# Patient Record
Sex: MALE | Race: WHITE
[De-identification: names, ages, dates, MRNs, and addresses within clinical notes are randomized per-mention and may not be internally consistent; named-entity substitution may affect disease eponyms.]

---

## 2018-01-02 ENCOUNTER — HOSPITAL ENCOUNTER (OUTPATIENT)
Dept: HOSPITAL 77 - KA.SDS | Age: 83
Discharge: NURSING, REHAB OR LONG-TERM CARE | End: 2018-01-02
Attending: OPHTHALMOLOGY
Payer: MEDICARE

## 2018-01-02 VITALS — DIASTOLIC BLOOD PRESSURE: 40 MMHG | SYSTOLIC BLOOD PRESSURE: 151 MMHG

## 2018-01-02 DIAGNOSIS — E03.9: ICD-10-CM

## 2018-01-02 DIAGNOSIS — D63.8: ICD-10-CM

## 2018-01-02 DIAGNOSIS — Z79.899: ICD-10-CM

## 2018-01-02 DIAGNOSIS — K21.9: ICD-10-CM

## 2018-01-02 DIAGNOSIS — H25.812: Primary | ICD-10-CM

## 2018-01-02 PROCEDURE — 66984 XCAPSL CTRC RMVL W/O ECP: CPT

## 2018-01-02 PROCEDURE — C1780 LENS, INTRAOCULAR (NEW TECH): HCPCS

## 2018-01-02 RX ADMIN — CYCLOPENTOLATE HYDROCHLORIDE SCH DROP: 10 SOLUTION/ DROPS OPHTHALMIC at 08:32

## 2018-01-02 RX ADMIN — PHENYLEPHRINE HYDROCHLORIDE SCH DROP: 100 SOLUTION/ DROPS OPHTHALMIC at 09:16

## 2018-01-02 RX ADMIN — CYCLOPENTOLATE HYDROCHLORIDE SCH DROP: 10 SOLUTION/ DROPS OPHTHALMIC at 08:47

## 2018-01-02 RX ADMIN — PHENYLEPHRINE HYDROCHLORIDE SCH DROP: 100 SOLUTION/ DROPS OPHTHALMIC at 08:42

## 2018-01-02 RX ADMIN — CYCLOPENTOLATE HYDROCHLORIDE SCH DROP: 10 SOLUTION/ DROPS OPHTHALMIC at 09:06

## 2018-01-02 RX ADMIN — PHENYLEPHRINE HYDROCHLORIDE SCH DROP: 100 SOLUTION/ DROPS OPHTHALMIC at 09:01

## 2018-01-03 NOTE — OR
DATE OF SURGERY:  01/02/2018

 

SURGEON:  Larry Nelson MD

 

PREOPERATIVE DIAGNOSIS:  Cataract, left eye.

 

POSTOPERATIVE DIAGNOSIS:  Cataract, left eye.

 

OPERATION PERFORMED:  Phacoemulsification with posterior chamber lens insertion,

left eye.

 

HISTORY:  The patient has had increasing difficulty seeing to read and

difficulty seeing television.  His vision is 20/100 in his left eye.  The eye

examination reveals that the patient has a combined-type cataract and he also

has a senile-type cataract.

 

FINDINGS:  The patient was taken to the operating room where appropriate

anesthesia, sedation and monitoring were provided.  A retrobulbar block was

given on the left side.  The eye was massaged and was found to be appropriately

soft.  The eye and eyelids were then prepped and draped in the usual sterile

manner.  A lid speculum was placed.  A micro sharp blade was used to enter the

anterior chamber inside the limbus inferior-temporally.  Xylocaine was irrigated

into the eye at this site. Healon was irrigated into the eye through this site.

Then using a 2.85 mm corneal blade an entry was made into the anterior chamber

just inside the limbus temporally.  Healon was again irrigated into the eye.

Then using a cystitome, the anterior capsulorrhexis was created.  The lens

nucleus was hydrodissected using a 27 gauge cannula and balanced salt solution.

The phacoemulsification unit was introduced through the temporal site and the

Darren spatula through the inferior temporal site.  In so doing, the lens

nucleus was phacoemulsified.  The cortical fragments of the lens were removed

using the irrigation aspiration unit.  The posterior capsule was polished.

Healon was irrigated into the eye.  The posterior chamber lens was inserted and

rotated into position inside the capsular bag.  The Healon was irrigated out of

the eye.  Miostat was irrigated into the eye and the pupil rounded nicely.  A

single interrupted 10-0 Nylon suture was placed through the temporal corneal

incision site.  Balanced salt solution was irrigated into the eye.  The wound

was tested and found to be tight.  Maxitrol ointment was placed into the

patient's left eye.  The eyelids were closed and an eye patch and dean shield

were placed.  The patient left the operating room in good condition.

 

DD:  01/02/2018 10:25:40

DT:  01/02/2018 14:40:45

Job #:  [id number]

## 2018-02-07 ENCOUNTER — HOSPITAL ENCOUNTER (OUTPATIENT)
Dept: HOSPITAL 77 - KA.ED | Age: 83
Setting detail: OBSERVATION
LOS: 1 days | Discharge: NURSING, REHAB OR LONG-TERM CARE | End: 2018-02-08
Attending: PHYSICIAN ASSISTANT | Admitting: PHYSICIAN ASSISTANT
Payer: MEDICARE

## 2018-02-07 DIAGNOSIS — M81.0: ICD-10-CM
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
## 2018-02-07 DIAGNOSIS — Z79.899: ICD-10-CM

## 2018-02-07 DIAGNOSIS — E78.00: ICD-10-CM

## 2018-02-07 DIAGNOSIS — E55.9: ICD-10-CM

## 2018-02-07 DIAGNOSIS — K21.9: ICD-10-CM

## 2018-02-07 DIAGNOSIS — E03.9: ICD-10-CM

## 2018-02-07 DIAGNOSIS — Z87.891: ICD-10-CM

## 2018-02-07 DIAGNOSIS — R62.7: ICD-10-CM

## 2018-02-07 DIAGNOSIS — E46: ICD-10-CM

## 2018-02-07 DIAGNOSIS — Z79.82: ICD-10-CM

## 2018-02-07 DIAGNOSIS — G20: ICD-10-CM

## 2018-02-07 DIAGNOSIS — S09.90XA: ICD-10-CM

## 2018-02-07 DIAGNOSIS — I25.10: ICD-10-CM

## 2018-02-07 DIAGNOSIS — G45.9: Primary | ICD-10-CM

## 2018-02-07 DIAGNOSIS — F32.9: ICD-10-CM

## 2018-02-07 DIAGNOSIS — F02.80: ICD-10-CM

## 2018-02-07 LAB
CHLORIDE SERPL-SCNC: 107 MMOL/L (ref 98–115)
SODIUM SERPL-SCNC: 140 MMOL/L (ref 136–145)

## 2018-02-07 PROCEDURE — G0378 HOSPITAL OBSERVATION PER HR: HCPCS

## 2018-02-07 NOTE — EDM.PDOC
ED HPI GENERAL MEDICAL PROBLEM





- General


Chief Complaint: Trauma


Stated Complaint: fall


Time Seen by Provider: 02/07/18 18:10


Source of Information: Reports: RN


History Limitations: Reports: Altered Mental Status





- History of Present Illness


INITIAL COMMENTS - FREE TEXT/NARRATIVE: 





89-year-old male resident at the [facility] walking to the 

doing all this evening as he uses a walker he lost his footing as he was only 

wearing his socks and not shoes and took a fall into a trash can he had a small 

abrasion over the right upper eye is felt that he had mild scalp swelling on 

the right occipital. There was no loss of consciousness. Nursing staff thought 

that he was mildly confused and was recommended that he be evaluated in the 

emergency room. EMS was dispatched and he is brought here for further 

evaluation. He currently denies significant pain any where specific. He is very 

soft-spoken and hard to hear. He is very thin and appears to be malnourished. 

He denies any specific chest pain or shortness of breath. He denies any 

headache. He is able to answer simple questions. He is in no acute distress. 

His blood pressure is elevated upon arrival.


Onset: Today


Onset Date: 02/07/18


Onset Time: 17:30


Duration: Minutes:


Location: Reports: Head


Severity: Mild


Improves with: Reports: None


Worsens with: Reports: None


Context: Reports: Trauma (fall)


Associated Symptoms: Reports: No Other Symptoms





- Related Data


 Allergies











Allergy/AdvReac Type Severity Reaction Status Date / Time


 


No Known Drug Allergies Allergy  NKDA Verified 02/07/18 18:46











Home Meds: 


 Home Meds





Carbidopa/Levodopa [Carbidopa-Levodopa ] 1 tab PO TID 06/10/14 [History]


Cholecalciferol (Vitamin D3) [Vitamin D3] 2,000 unit PO DAILY 06/10/14 [History]


Cyanocobalamin (Vitamin B-12) [Cyanocobalamin Injection] 1,000 mcg IM 

ASDIRECTED 06/10/14 [History]


FLUoxetine HCl [Fluoxetine HCl] 20 mg PO DAILY 06/10/14 [History]


Sennosides/Docusate Sodium [Senna S Tablet] 1 each PO DAILY 06/10/14 [History]


Multivitamin with Minerals [Multiple Vitamin] 1 tab PO DAILY 01/02/16 [History]


Acetaminophen [Acetaminophen Extra Strength] 500 mg PO BID 02/27/16 [History]


Acetaminophen [Tylenol Extra Strength] 500 mg PO ASDIRECTED PRN 02/27/16 [

History]


Midodrine 2.5 mg PO TIDAC #90 tablet 02/29/16 [Rx]


Ascorbic Acid 500 mg PO BID 07/28/16 [History]


Magnesium Hydroxide [Milk of Magnesia] 1 dose PO DAILY PRN 07/28/16 [History]


Loperamide [Imodium] 2 mg PO Q4H PRN #30 cap 08/01/16 [Rx]


Ferrous Sulfate [Iron] 325 mg PO DAILY 02/07/18 [History]


Levothyroxine Sodium [Synthroid] 100 mcg PO ACBREAKFAST 02/07/18 [History]


Lutein/Minerals/Vit A,C & E [Ocuvite] 1 tab PO DAILY 02/07/18 [History]


Mirtazapine [Remeron] 15 mg PO BEDTIME 02/07/18 [History]


Omeprazole 20 mg PO DAILY 02/07/18 [History]


Sucralfate [Carafate] 1 gram PO BID 02/07/18 [History]


Trolamine Salicylate/Aloe Vera [Aspercreme 10% Cream] 1 applic TOP BEDTIME PRN 

02/07/18 [History]


Trolamine Salicylate/Aloe Vera [Aspercreme 10% Cream] 1 applic TOP TID 02/07/18 

[History]


prednisoLONE Acetate [Pred Forte 1% Ophth Susp] 1 drop EYELF QID 02/07/18 [

History]











Past Medical History


HEENT History: Reports: Cataract, Epistaxis, Hard of Hearing, Impaired Vision, 

Sinusitis


Cardiovascular History: Reports: CAD, High Cholesterol


Other Cardiovascular History: hypotension, athrosclerotic heart disease.


Gastrointestinal History: Reports: Chronic Constipation, Chronic Diarrhea, GERD


Genitourinary History: Reports: UTI, Recurrent


Musculoskeletal History: Reports: Osteoarthritis


Other Musculoskeletal History: generalized muscle weakness


Neurological History: Reports: Parkinson's


Other Neuro History: dementia


Psychiatric History: Reports: Depression


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


Endocrine/Metabolic History: Reports: Hypothyroidism, Vitamin D Deficiency


Hematologic History: Reports: Anemia





- Past Surgical History


HEENT Surgical History: Reports: Cataract Surgery


Cardiovascular Surgical History: Reports: None


Respiratory Surgical History: Reports: None


Other Musculoskeletal Surgeries/Procedures:: arthritis all over.





Social & Family History





- Family History


Family Medical History: Noncontributory





- Tobacco Use


Smoking Status *Q: Former Smoker


Years of Tobacco use: 30


Packs/Tins Daily: 2


Used Tobacco, but Quit: Yes


Month Tobacco Last Used: 50 years


Second Hand Smoke Exposure: No





- Caffeine Use


Caffeine Use: Reports: Soda





- Alcohol Use


Days Per Week of Alcohol Use: 0





- Recreational Drug Use


Recreational Drug Use: No





ED ROS GENERAL





- Review of Systems


Review Of Systems: ROS reveals no pertinent complaints other than HPI.





ED EXAM, HEAD INJURY





- Physical Exam


Exam: See Below


Exam Limited By: Altered Mental Status


General Appearance: Alert, No Apparent Distress, Thin


Head: Scalp Swelling.  No: Scalp Lacerations, Crabtree's Sign, Facial Abrasions, 

Facial Swelling, Sinus Tenderness, Facial Tenderness, Raccoon Eyes


Eyes: Right Eye: Abnormal Pupil (right pupil is larger, pupils reexamed and 

equal after CT), Bilateral Eye: EOMI


Ears: Normal External Exam, Normal Canal, Hearing Grossly Normal, Normal TMs


Nose: Normal Inspection, Normal Mucousa, No Blood


Throat/Mouth: Normal Inspection, Normal Lips, Normal Gums, Normal Oropharynx, 

Normal Voice, No Airway Compromise


Neck: Non-Tender, Full Range of Motion, Normal Alignment, Normal Inspection


Respiratory: No Respiratory Distress, Lungs Clear, Normal Breath Sounds, No 

Accessory Muscle Use


Cardiovascular: Regular Rate, Rhythm, No JVD, No Murmur


GI/Abdominal Exam: Soft, Non-Tender


Back Exam: Normal Inspection, Full Range of Motion, Paraspinal Tenderness


Extremities: Normal Inspection, Normal Range of Motion, Non-Tender, No Pedal 

Edema, Normal Capillary Refill


Neurologic: CNs II-XII nml As Tested, No Motor/Sensory Deficits, Alert, Normal 

Mood/Affect, Oriented x 3


Skin: Normal Color, Warm/Dry, Ecchymosis, Other (small skin tear right arm)





- Jose Luis Coma Score


Best Eye Response (Jose Luis): (4) Open Spontaneously


Best Verbal Response (Hudson): (5) Oriented


Best Motor Response (Hudson): (6) Obeys Commands


Jose Luis Total: 15





Course





- Vital Signs


Last Recorded V/S: 


 Last Vital Signs











Temp  95.1 F L  02/07/18 18:25


 


Pulse  54 L  02/07/18 19:18


 


Resp  16   02/07/18 19:18


 


BP  174/37 H  02/07/18 19:18


 


Pulse Ox  100   02/07/18 19:18














- Orders/Labs/Meds


Orders: 


 Active Orders 24 hr











 Category Date Time Status


 


 Head wo Cont [CT] Stat Exams  02/07/18 18:31 Ordered














- Radiology Interpretation


Free Text/Narrative:: 





CT the head without IV contrast





There is a comparison correlation is made with the exam of [place] 10, 2014





Findings;


There is no mass or mass effect


There is no hemorrhage or hydrocephalus


There are no extra axial fluid collections


There are no sites of abnormal attenuation





Impression:


 No plain CT evidence of acute intracranial process





CT Results Date: 02/07/18


CT Results Time: 19:30





- Re-Assessments/Exams


Free Text/Narrative Re-Assessment/Exam: 





02/07/18 19:14


Patient is alert and oriented, again he describes no distress no discomfort. He 

is comfortable at bedside. Pupils were reexamined after CT scan are now 

symmetric and equal.





Departure





- Departure


Time of Disposition: 19:35


Disposition: DC/Tfer to [facility]


Condition: Good


Clinical Impression: 


Fall


Qualifiers:


 Encounter type: initial encounter Qualified Code(s): W19.XXXA - Unspecified 

fall, initial encounter





Head contusion


Qualifiers:


 Encounter type: initial encounter Contusion of head detail: eyelid Laterality: 

right Qualified Code(s): S00.11XA - Contusion of right eyelid and periocular 

area, initial encounter








- Discharge Information


Instructions:  Head Injury, Adult, Easy-to-Read


Referrals: 


Susannah Armstrong MD [Primary Care Provider] - 


Forms:  ED Department Discharge


Additional Instructions: 


1. Resume activities.


2. Ambulate with a walker and first assist.





- My Orders


Last 24 Hours: 


My Active Orders





02/07/18 18:31


Head wo Cont [CT] Stat 














- Assessment/Plan


Last 24 Hours: 


My Active Orders





02/07/18 18:31


Head wo Cont [CT] Stat 











Assessment:: 





Fall


Head contusion





Plan: 





1. Resume activities


2. Ambulate with walker and assist.

## 2018-02-08 VITALS — SYSTOLIC BLOOD PRESSURE: 148 MMHG | DIASTOLIC BLOOD PRESSURE: 54 MMHG

## 2018-02-08 NOTE — PCM.DCSUM1
**Discharge Summary





- Discharge Data


Discharge Date: 02/08/18


Discharge Disposition: DC/Tfer to SN[facility]


Condition: Fair





- Patient Summary/Data


Complications: Patient did start having some weakness while in the ED leaning 

to his right side with some difficulty speech so he was admitted for further 

observation and placed on aspirin. Otherwise no other complications throughout 

the night.  He had normal neurological assessments thereafter


Hospital Course: 


Patient's hospital course after admission went as expected. He had no 

neurological deficits. He was placed on aspirin 81 mg. We will continue this 

upon discharge. He is a high fall risk. Head CT in the ED did not show any 

acute process. Electrolytes normal. Although he did have some  transient 

difficulty speech with right-sided weakness this quickly normalized upon 

admission. He had normal neurological assessments after admission. Normal 

Glascow coma








- Patient Instructions


Diet: Usual Diet as Tolerated


Activity: As Tolerated


Driving: Do Not Drive


Showering/Bathing: May Shower


Notify Provider of: Fever, Nausea and/or Vomiting


Other/Special Instructions: Monitor for any confusion or unsteady gait.  Fall 

risk precautions,.  Nutritional consultation





- Discharge Plan


Prescriptions/Med Rec: 


Aspirin 81 mg PO BEDTIME #90 tab.chew


Home Medications: 


 Home Meds





Cholecalciferol (Vitamin D3) [Vitamin D3] 2,000 unit PO DAILY 06/10/14 [History]


Cyanocobalamin (Vitamin B-12) [Cyanocobalamin Injection] 1,000 mcg IM 

ASDIRECTED 06/10/14 [History]


FLUoxetine HCl [Fluoxetine HCl] 20 mg PO DAILY 06/10/14 [History]


Sennosides/Docusate Sodium [Senna S Tablet] 1 each PO DAILY 06/10/14 [History]


Multivitamin with Minerals [Multiple Vitamin] 1 tab PO DAILY 01/02/16 [History]


Acetaminophen [Acetaminophen Extra Strength] 500 mg PO BID 02/27/16 [History]


Acetaminophen [Tylenol Extra Strength] 500 mg PO ASDIRECTED PRN 02/27/16 [

History]


Midodrine 2.5 mg PO TIDAC #90 tablet 02/29/16 [Rx]


Ascorbic Acid 500 mg PO BID 07/28/16 [History]


Magnesium Hydroxide [Milk of Magnesia] 30 ml PO DAILY PRN 07/28/16 [History]


Loperamide [Imodium] 2 mg PO Q4H PRN #30 cap 08/01/16 [Rx]


Carbidopa/Levodopa [Sinemet  mg Tablet] 1 tab PO TID 02/07/18 [History]


Ferrous Sulfate [Iron] 325 mg PO DAILY 02/07/18 [History]


Levothyroxine Sodium [Synthroid] 100 mcg PO ACBREAKFAST 02/07/18 [History]


Lutein/Minerals/Vit A,C & E [Ocuvite] 1 tab PO DAILY 02/07/18 [History]


Mirtazapine [Remeron] 15 mg PO BEDTIME 02/07/18 [History]


Omeprazole 20 mg PO ACBREAKFAST 02/07/18 [History]


Sucralfate [Carafate] 1 gram PO BID 02/07/18 [History]


Trolamine Salicylate/Aloe Vera [Aspercreme 10% Cream] 1 applic TOP BEDTIME PRN 

02/07/18 [History]


Trolamine Salicylate/Aloe Vera [Aspercreme 10% Cream] 1 applic TOP TID 02/07/18 

[History]


prednisoLONE Acetate [Pred Forte 1% Ophth Susp] 1 drop EYELF QID PRN 02/07/18 [

History]


Aspirin 81 mg PO BEDTIME #90 tab.chew 02/08/18 [Rx]











- Discharge Summary/Plan Comment


DC Time >30 min.: No


Discharge Summary/Plan Comment: 








Final diagnosis





Head injury,


Fall


Transient ischemic attack


Malnutrition








- General Info


Functional Status: Reports: Pain Controlled, Tolerating Diet





- Review of Systems


General: Reports: Weakness


Pulmonary: Reports: No Symptoms


Cardiovascular: Reports: No Symptoms


Gastrointestinal: Reports: No Symptoms


Genitourinary: Reports: No Symptoms


Musculoskeletal: Reports: No Symptoms


Skin: Reports: Bruising (Bruising on arms present on admission)


Neurological: Denies: Confusion, Headache, Numbness, Paresthesia, Syncope, 

Change in Speech


Psychiatric: Reports: No Symptoms





- Patient Data


Vitals - Most Recent: 


 Last Vital Signs











Temp  98.6 F   02/08/18 06:34


 


Pulse  54 L  02/08/18 06:34


 


Resp  20   02/08/18 06:34


 


BP  148/54 H  02/08/18 06:34


 


Pulse Ox  100   02/08/18 06:34











Weight - Most Recent: 86 lb


I&O - Last 24 hours: 


 Intake & Output











 02/07/18 02/08/18 02/08/18





 22:59 06:59 14:59


 


Intake Total  378 


 


Output Total 50 525 


 


Balance -50 -147 











Lab Results - Last 24 hrs: 


 Laboratory Results - last 24 hr











  02/07/18 02/07/18 Range/Units





  21:03 21:03 


 


WBC  6.5   (5.0-10.0)  10^3/uL


 


RBC  3.19 L   (4.50-6.00)  10^6/uL


 


Hgb  10.3 L   (13.0-17.0)  g/dL


 


Hct  31.4 L   (40.0-52.0)  %


 


MCV  98.4 H   (82.0-92.0)  fL


 


MCH  32.4 H   (27.0-31.0)  pg


 


MCHC  32.9   (32.0-36.0)  g/dL


 


RDW  12.4   (11.5-14.5)  %


 


Plt Count  148 L   (150-300)  10^3/uL


 


MPV  8.5   (7.4-10.4)  fL


 


Neut % (Auto)  75.6 H   (50.0-70.0)  %


 


Lymph % (Auto)  15.5 L   (20.0-40.0)  %


 


Mono % (Auto)  4.7   (2.0-8.0)  %


 


Eos % (Auto)  3.1 H   (1.0-3.0)  %


 


Baso % (Auto)  1.1 H   (0.0-1.0)  %


 


Neut # (Auto)  4.9   (2.5-7.0)  10^3/uL


 


Lymph # (Auto)  1.0   (1.0-4.0)  10^3/uL


 


Mono # (Auto)  0.3   (0.1-0.8)  10^3/uL


 


Eos # (Auto)  0.2   (0.1-0.3)  10^3/uL


 


Baso # (Auto)  0.1   (0.0-0.1)  10^3/uL


 


Sodium   140  (136-145)  mmol/L


 


Potassium   4.8  (3.3-5.3)  mmol/L


 


Chloride   107  ()  mmol/L


 


Carbon Dioxide   25.0  (21.0-32.0)  mmol/L


 


BUN   33 H  (6-25)  mg/dL


 


Creatinine   1.28 H  (0.51-1.17)  mg/dL


 


Est Cr Clr Drug Dosing   21.59  mL/min


 


Estimated GFR (MDRD)   53  mL/min


 


Glucose   91  ()  mg/dL


 


Calcium   8.5 L  (8.7-10.3)  mg/dL











Med Orders - Current: 


 Current Medications





Aspirin (Aspirin)  81 mg PO BEDTIME [facility]


   Last Admin: 02/07/18 21:29 Dose:  81 mg


Sodium Chloride (Normal Saline)  1,000 mls @ 50 mls/hr IV ASDIRECTED [facility]


   Last Admin: 02/07/18 23:34 Dose:  50 mls/hr











- Exam


Quality Assessment: Denies: Supplemental Oxygen


General: Reports: Alert, Oriented, Cooperative, No Acute Distress, Other (

Patient very thin, appears malnourished)


HEENT: Reports: Pupils Equal, Pupils Reactive, EOMI, Other (Pupils equal)


Neck: Reports: Supple


Lungs: Reports: Clear to Auscultation, Normal Respiratory Effort


Cardiovascular: Reports: Regular Rate, Regular Rhythm


GI/Abdominal Exam: Normal Bowel Sounds, Soft, Non-Tender, No Organomegaly, No 

Distention, No Abnormal Bruit, No Mass, Pelvis Stable


Back Exam: Denies: CVA Tenderness (L), CVA Tenderness (R)


Neurological: Reports: No New Focal Deficit, Normal Speech (Slow speech, chronic

)


Psy/Mental Status: Reports: Alert, Normal Affect, Normal Mood





*Q Meaningful Use (DIS)





- VTE *Q


VTE Criteria *Q: 








- Stroke *Q


Stroke Criteria *Q: 








- AMI *Q


AMI Criteria *Q: Subjective   Popeye High 

## 2018-06-24 ENCOUNTER — HOSPITAL ENCOUNTER (EMERGENCY)
Dept: HOSPITAL 77 - KA.ED | Age: 83
Discharge: NURSING, REHAB OR LONG-TERM CARE | End: 2018-06-24
Payer: MEDICARE

## 2018-06-24 VITALS — SYSTOLIC BLOOD PRESSURE: 120 MMHG | DIASTOLIC BLOOD PRESSURE: 28 MMHG

## 2018-06-24 DIAGNOSIS — Z87.442: ICD-10-CM

## 2018-06-24 DIAGNOSIS — E03.9: ICD-10-CM

## 2018-06-24 DIAGNOSIS — I25.10: ICD-10-CM
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
## 2018-06-24 DIAGNOSIS — S70.02XA: Primary | ICD-10-CM

## 2018-06-24 DIAGNOSIS — Z79.899: ICD-10-CM

## 2018-06-24 DIAGNOSIS — K21.9: ICD-10-CM

## 2018-06-24 DIAGNOSIS — E78.00: ICD-10-CM

## 2018-06-24 DIAGNOSIS — Z79.82: ICD-10-CM

## 2018-06-24 NOTE — EDM.PDOC
ED HPI GENERAL MEDICAL PROBLEM





- General


Chief Complaint: General


Stated Complaint: FALL


Time Seen by Provider: 06/24/18 12:10


Source of Information: Reports: Patient


History Limitations: Reports: No Limitations





- History of Present Illness


Onset: Today


Onset Date: 06/24/18


Duration: Minutes:


Location: Reports: Head, Lower Extremity, Left


Quality: Reports: Ache


Severity: Mild


Improves with: Reports: Rest


Context: Reports: Other (fall)


Associated Symptoms: Reports: No Other Symptoms.  Denies: Confusion, Syncope


Treatments PTA: Reports: Acetaminophen


  ** Headache


Pain Score (Numeric/FACES): 8





- Related Data


 Allergies











Allergy/AdvReac Type Severity Reaction Status Date / Time


 


No Known Drug Allergies Allergy  NKDA Verified 06/24/18 12:11











Home Meds: 


 Home Meds





Cholecalciferol (Vitamin D3) [Vitamin D3] 2,000 unit PO DAILY 06/10/14 [History]


Cyanocobalamin (Vitamin B-12) [Cyanocobalamin Injection] 1,000 mcg IM 

ASDIRECTED 06/10/14 [History]


FLUoxetine HCl [Fluoxetine HCl] 20 mg PO DAILY 06/10/14 [History]


Sennosides/Docusate Sodium [Senna-S Tablet] 1 each PO DAILY 06/10/14 [History]


Multivitamin with Minerals [Multiple Vitamin] 1 tab PO DAILY 01/02/16 [History]


Acetaminophen [Acetaminophen Extra Strength] 500 mg PO BID 02/27/16 [History]


Acetaminophen [Tylenol Extra Strength] 500 mg PO ASDIRECTED PRN 02/27/16 [

History]


Midodrine 2.5 mg PO TIDAC #90 tablet 02/29/16 [Rx]


Ascorbic Acid 500 mg PO BID 07/28/16 [History]


Magnesium Hydroxide [Milk of Magnesia] 30 ml PO DAILY PRN 07/28/16 [History]


Loperamide [Imodium] 2 mg PO Q4H PRN #30 cap 08/01/16 [Rx]


Carbidopa/Levodopa [Sinemet  mg Tablet] 1 tab PO TID 02/07/18 [History]


Ferrous Sulfate [Iron] 325 mg PO DAILY 02/07/18 [History]


Levothyroxine Sodium [Synthroid] 100 mcg PO ACBREAKFAST 02/07/18 [History]


Lutein/Minerals/Vit A,C & E [Ocuvite] 1 tab PO DAILY 02/07/18 [History]


Mirtazapine [Remeron] 15 mg PO BEDTIME 02/07/18 [History]


Omeprazole 20 mg PO ACBREAKFAST 02/07/18 [History]


Sucralfate [Carafate] 1 gram PO BID 02/07/18 [History]


Trolamine Salicylate/Aloe Vera [Aspercreme 10% Cream] 1 applic TOP BEDTIME PRN 

02/07/18 [History]


Trolamine Salicylate/Aloe Vera [Aspercreme 10% Cream] 1 applic TOP TID 02/07/18 

[History]


prednisoLONE Acetate [Pred Forte 1% Ophth Susp] 1 drop EYELF QID 02/07/18 [

History]


Aspirin 81 mg PO BEDTIME #90 tab.chew 02/08/18 [Rx]











Past Medical History


HEENT History: Reports: Cataract, Epistaxis, Hard of Hearing, Impaired Vision, 

Sinusitis


Cardiovascular History: Reports: CAD, High Cholesterol


Other Cardiovascular History: hypotension, athrosclerotic heart disease.


Gastrointestinal History: Reports: Chronic Constipation, Chronic Diarrhea, GERD


Genitourinary History: Reports: UTI, Recurrent


Musculoskeletal History: Reports: Osteoarthritis


Other Musculoskeletal History: generalized muscle weakness


Neurological History: Reports: Parkinson's


Other Neuro History: dementia


Psychiatric History: Reports: Depression


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


Endocrine/Metabolic History: Reports: Hypothyroidism, Vitamin D Deficiency


Hematologic History: Reports: Anemia





- Past Surgical History


HEENT Surgical History: Reports: Cataract Surgery


Cardiovascular Surgical History: Reports: None


Respiratory Surgical History: Reports: None


Other Musculoskeletal Surgeries/Procedures:: arthritis all over.





Social & Family History





- Family History


Family Medical History: Noncontributory





- Caffeine Use


Caffeine Use: Reports: Soda





ED ROS GENERAL





- Review of Systems


Review Of Systems: ROS reveals no pertinent complaints other than HPI.





ED EXAM, GENERAL





- Physical Exam


Exam: See Below


General Appearance: Alert, No Apparent Distress, Thin


Eye Exam: Bilateral Eye: EOMI, PERRL


Nose: Normal Inspection


Throat/Mouth: Normal Inspection, Normal Voice, No Airway Compromise


Head: Atraumatic, Normocephalic


Neck: Normal Inspection, Supple, Non-Tender, Full Range of Motion


Respiratory/Chest: No Respiratory Distress, Lungs Clear, Normal Breath Sounds, 

Chest Non-Tender


Cardiovascular: Regular Rate, Rhythm


GI/Abdominal: Soft, Non-Tender


Extremities: Normal Inspection, Normal Range of Motion, Non-Tender, No Pedal 

Edema, Other (Hip exam is negative for findings regarding the pain with 

internal or external rotation or hip flexion left hip. Negative are roll sign. 

There is no leg shortening or external rotation. Mild tenderness over the 

lateral hip.).  No: Limited Range of Motion


Neurological: Alert, Oriented, CN II-XII Intact, No Motor/Sensory Deficits


Psychiatric: Depressed Mood, Flat Affect


Skin Exam: Warm, Dry, Intact, Normal Color, No Rash





Course





- Vital Signs


Last Recorded V/S: 





 Last Vital Signs











Temp  97.1 F   06/24/18 12:01


 


Pulse  63   06/24/18 12:01


 


Resp  18   06/24/18 12:01


 


BP  120/28 L  06/24/18 12:01


 


Pulse Ox  97   06/24/18 12:01














- Orders/Labs/Meds


Orders: 





 Active Orders 24 hr











 Category Date Time Status


 


 Pelvis 1V or 2V [CR] Stat Exams  06/24/18 12:08 Ordered














- Radiology Interpretation


Free Text/Narrative:: 





X-ray AP pelvis and left hip


Impression: Negative for hip fracture.





- Re-Assessments/Exams


Free Text/Narrative Re-Assessment/Exam: 





06/24/18 12:28


Patient was reassessed with left hip with further range of motion and he 

reports no significant tenderness we are able to sit him up standing and 

transfer to wheelchair without increased pain or discomfort.








Departure





- Departure


Time of Disposition: 12:29


Disposition: DC/Tfer to [place] [address]


Condition: Good


Clinical Impression: 


Contusion of left hip


Qualifiers:


 Encounter type: initial encounter Qualified Code(s): S70.02XA - Contusion of 

left hip, initial encounter








- Discharge Information


Referrals: 


Susannah Armstrong MD [Primary Care Provider] - 


Additional Instructions: 


1. Patient may return back to skilled nursing facility


2. Activities he may resume previous activity as a tolerated


3. Diet as tolerated


4. Follow-up with primary care for any complaints or concerns or continued pain


5. Patient will resume his regular home medications





- My Orders


Last 24 Hours: 





My Active Orders





06/24/18 12:08


Pelvis 1V or 2V [CR] Stat 














- Assessment/Plan


Last 24 Hours: 





My Active Orders





06/24/18 12:08


Pelvis 1V or 2V [CR] Stat 











Assessment:: 





left hip contusion


Plan: 





1. Patient may return back to skilled nursing facility


2. Activities he may resume previous activity as a tolerated


3. Diet as tolerated


4. Follow-up with primary care for any complaints or concerns or continued pain


5. Patient will resume his regular home medications